# Patient Record
Sex: MALE | Race: ASIAN | NOT HISPANIC OR LATINO | URBAN - METROPOLITAN AREA
[De-identification: names, ages, dates, MRNs, and addresses within clinical notes are randomized per-mention and may not be internally consistent; named-entity substitution may affect disease eponyms.]

---

## 2019-09-01 ENCOUNTER — EMERGENCY (EMERGENCY)
Facility: HOSPITAL | Age: 34
LOS: 1 days | Discharge: HOME | End: 2019-09-01
Attending: EMERGENCY MEDICINE
Payer: MEDICAID

## 2019-09-01 VITALS
RESPIRATION RATE: 16 BRPM | OXYGEN SATURATION: 98 % | HEART RATE: 88 BPM | TEMPERATURE: 100 F | DIASTOLIC BLOOD PRESSURE: 78 MMHG | SYSTOLIC BLOOD PRESSURE: 153 MMHG

## 2019-09-01 VITALS
WEIGHT: 220.02 LBS | TEMPERATURE: 100 F | DIASTOLIC BLOOD PRESSURE: 77 MMHG | RESPIRATION RATE: 20 BRPM | OXYGEN SATURATION: 96 % | SYSTOLIC BLOOD PRESSURE: 166 MMHG | HEART RATE: 100 BPM

## 2019-09-01 DIAGNOSIS — Z98.89 OTHER SPECIFIED POSTPROCEDURAL STATES: Chronic | ICD-10-CM

## 2019-09-01 LAB
ALBUMIN SERPL ELPH-MCNC: 4.4 G/DL — SIGNIFICANT CHANGE UP (ref 3.3–5)
ALP SERPL-CCNC: 104 U/L — SIGNIFICANT CHANGE UP (ref 40–120)
ALT FLD-CCNC: 44 U/L — SIGNIFICANT CHANGE UP (ref 10–45)
ANION GAP SERPL CALC-SCNC: 13 MMOL/L — SIGNIFICANT CHANGE UP (ref 5–17)
AST SERPL-CCNC: 28 U/L — SIGNIFICANT CHANGE UP (ref 10–40)
BASOPHILS # BLD AUTO: 0 K/UL — SIGNIFICANT CHANGE UP (ref 0–0.2)
BASOPHILS NFR BLD AUTO: 0.4 % — SIGNIFICANT CHANGE UP (ref 0–2)
BILIRUB SERPL-MCNC: 0.4 MG/DL — SIGNIFICANT CHANGE UP (ref 0.2–1.2)
BUN SERPL-MCNC: 13 MG/DL — SIGNIFICANT CHANGE UP (ref 7–23)
CALCIUM SERPL-MCNC: 9.8 MG/DL — SIGNIFICANT CHANGE UP (ref 8.4–10.5)
CHLORIDE SERPL-SCNC: 98 MMOL/L — SIGNIFICANT CHANGE UP (ref 96–108)
CO2 SERPL-SCNC: 25 MMOL/L — SIGNIFICANT CHANGE UP (ref 22–31)
CREAT SERPL-MCNC: 0.86 MG/DL — SIGNIFICANT CHANGE UP (ref 0.5–1.3)
EOSINOPHIL # BLD AUTO: 0.4 K/UL — SIGNIFICANT CHANGE UP (ref 0–0.5)
EOSINOPHIL NFR BLD AUTO: 2.7 % — SIGNIFICANT CHANGE UP (ref 0–6)
GLUCOSE SERPL-MCNC: 112 MG/DL — HIGH (ref 70–99)
HCT VFR BLD CALC: 43.5 % — SIGNIFICANT CHANGE UP (ref 39–50)
HGB BLD-MCNC: 14.2 G/DL — SIGNIFICANT CHANGE UP (ref 13–17)
LYMPHOCYTES # BLD AUTO: 22 % — SIGNIFICANT CHANGE UP (ref 13–44)
LYMPHOCYTES # BLD AUTO: 3 K/UL — SIGNIFICANT CHANGE UP (ref 1–3.3)
MCHC RBC-ENTMCNC: 28.9 PG — SIGNIFICANT CHANGE UP (ref 27–34)
MCHC RBC-ENTMCNC: 32.6 GM/DL — SIGNIFICANT CHANGE UP (ref 32–36)
MCV RBC AUTO: 88.6 FL — SIGNIFICANT CHANGE UP (ref 80–100)
MONOCYTES # BLD AUTO: 1.3 K/UL — HIGH (ref 0–0.9)
MONOCYTES NFR BLD AUTO: 9.2 % — SIGNIFICANT CHANGE UP (ref 2–14)
NEUTROPHILS # BLD AUTO: 9.1 K/UL — HIGH (ref 1.8–7.4)
NEUTROPHILS NFR BLD AUTO: 65.7 % — SIGNIFICANT CHANGE UP (ref 43–77)
PLATELET # BLD AUTO: 240 K/UL — SIGNIFICANT CHANGE UP (ref 150–400)
POTASSIUM SERPL-MCNC: 3.9 MMOL/L — SIGNIFICANT CHANGE UP (ref 3.5–5.3)
POTASSIUM SERPL-SCNC: 3.9 MMOL/L — SIGNIFICANT CHANGE UP (ref 3.5–5.3)
PROT SERPL-MCNC: 7.7 G/DL — SIGNIFICANT CHANGE UP (ref 6–8.3)
RBC # BLD: 4.91 M/UL — SIGNIFICANT CHANGE UP (ref 4.2–5.8)
RBC # FLD: 12.6 % — SIGNIFICANT CHANGE UP (ref 10.3–14.5)
SODIUM SERPL-SCNC: 136 MMOL/L — SIGNIFICANT CHANGE UP (ref 135–145)
WBC # BLD: 13.9 K/UL — HIGH (ref 3.8–10.5)
WBC # FLD AUTO: 13.9 K/UL — HIGH (ref 3.8–10.5)

## 2019-09-01 PROCEDURE — 99284 EMERGENCY DEPT VISIT MOD MDM: CPT

## 2019-09-01 PROCEDURE — 80053 COMPREHEN METABOLIC PANEL: CPT

## 2019-09-01 PROCEDURE — 96375 TX/PRO/DX INJ NEW DRUG ADDON: CPT

## 2019-09-01 PROCEDURE — 71046 X-RAY EXAM CHEST 2 VIEWS: CPT

## 2019-09-01 PROCEDURE — 71046 X-RAY EXAM CHEST 2 VIEWS: CPT | Mod: 26

## 2019-09-01 PROCEDURE — 99284 EMERGENCY DEPT VISIT MOD MDM: CPT | Mod: 25

## 2019-09-01 PROCEDURE — 96374 THER/PROPH/DIAG INJ IV PUSH: CPT

## 2019-09-01 PROCEDURE — 85027 COMPLETE CBC AUTOMATED: CPT

## 2019-09-01 RX ORDER — SODIUM CHLORIDE 9 MG/ML
1000 INJECTION INTRAMUSCULAR; INTRAVENOUS; SUBCUTANEOUS ONCE
Refills: 0 | Status: COMPLETED | OUTPATIENT
Start: 2019-09-01 | End: 2019-09-01

## 2019-09-01 RX ORDER — ACETAMINOPHEN 500 MG
650 TABLET ORAL ONCE
Refills: 0 | Status: COMPLETED | OUTPATIENT
Start: 2019-09-01 | End: 2019-09-01

## 2019-09-01 RX ORDER — METOCLOPRAMIDE HCL 10 MG
10 TABLET ORAL ONCE
Refills: 0 | Status: COMPLETED | OUTPATIENT
Start: 2019-09-01 | End: 2019-09-01

## 2019-09-01 RX ORDER — KETOROLAC TROMETHAMINE 30 MG/ML
15 SYRINGE (ML) INJECTION ONCE
Refills: 0 | Status: DISCONTINUED | OUTPATIENT
Start: 2019-09-01 | End: 2019-09-01

## 2019-09-01 RX ADMIN — SODIUM CHLORIDE 1000 MILLILITER(S): 9 INJECTION INTRAMUSCULAR; INTRAVENOUS; SUBCUTANEOUS at 04:56

## 2019-09-01 RX ADMIN — Medication 650 MILLIGRAM(S): at 03:39

## 2019-09-01 RX ADMIN — SODIUM CHLORIDE 1000 MILLILITER(S): 9 INJECTION INTRAMUSCULAR; INTRAVENOUS; SUBCUTANEOUS at 03:39

## 2019-09-01 RX ADMIN — Medication 10 MILLIGRAM(S): at 03:39

## 2019-09-01 RX ADMIN — Medication 15 MILLIGRAM(S): at 04:55

## 2019-09-01 NOTE — ED PROVIDER NOTE - CLINICAL SUMMARY MEDICAL DECISION MAKING FREE TEXT BOX
34M w/ multiple complaints - consistent w/ viral URI, low concern for intracranial pathology, will check basics, cxr, give sx relief, reassess 34M w/ multiple complaints - consistent w/ viral URI, low concern for intracranial pathology, will check basics, cxr, give sx relief, reassess    MONICA Sterling MD: Pt is a 35 y/o M w/ pmh colon CA s/p resection (in remission, not on chemo) -- p/w congestion, headache, cough, fever/chills x 1 wk, worsening. No n/v/d/c, chest / abd pain, cough, dizziness, dysuria/hematuria. No recent travel, medication change, illness, or hospitalization. Has not had flu shot this yr. No neck pain/stiffness/meningismus/IVDA, back pain.  Ddx includes, however, is not limited to: viral syndrome, influenza, pna, uti, other. Plan: basic labs, u/a, ucx, cxr, IVF, pain control, reassess

## 2019-09-01 NOTE — ED PROVIDER NOTE - PHYSICAL EXAMINATION
*GEN:   mildly uncomfortable, in no acute distress, AOx3  *EYES:   pupils equally round and reactive to light, extra-occular movements intact  *HEENT:   airway patent, moist mucosal membranes  *CV:   regular rate and rhythm  *RESP:   clear to auscultation bilaterally, non-labored  *ABD:   soft, non-tender througout  *:   no cva/flank tenderness  *EXTREM:   no MSK tenderness, full ROM throughout, no leg swelling  *SKIN:   dry, intact  *NEURO:   AOx3, cranial nerves intact throughout, strength 5/5, no focal loss of sensation, no pronator drift, finger/nose normal, ambulating w/ normal gait

## 2019-09-01 NOTE — ED PROVIDER NOTE - PATIENT PORTAL LINK FT
You can access the FollowMyHealth Patient Portal offered by [facility] by registering at the following website: [email or web address]. By joining [facility]’s FollowMyHealth portal, you will also be able to view your health information using other applications (apps) compatible with our system.

## 2019-09-01 NOTE — ED PROVIDER NOTE - OBJECTIVE STATEMENT
34M w/ pmh colon CA s/p resection (in remission, not on chemo) -- p/w congestion, headache, cough, fever/chills x 1 wk, worsening. No n/v/d/c, chest / abd pain, cough, dizziness, dysuria/hematuria. No recent travel, medication change, illness, or hospitalization.

## 2019-09-01 NOTE — ED PROVIDER NOTE - NSFOLLOWUPINSTRUCTIONS_ED_ALL_ED_FT
Follow up with your primary care doctor within the next 3-5 days for re-evaluation and continued care.   Please take TYLENOL 650mg every 4 hours, and IBUPROFEN 400mg every 6 hours, as needed for pain.    Viral Respiratory Infection  A respiratory infection is an illness that affects part of the respiratory system, such as the lungs, nose, or throat. A respiratory infection that is caused by a virus is called a viral respiratory infection.    Common types of viral respiratory infections include:  A cold.  The flu (influenza).  A respiratory syncytial virus (RSV) infection.  What are the causes?  This condition is caused by a virus.    What are the signs or symptoms?  Symptoms of this condition include:  A stuffy or runny nose.  Yellow or green nasal discharge.  A cough.  Sneezing.  Fatigue.  Achy muscles.  A sore throat.  Sweating or chills.  A fever.  A headache.  How is this diagnosed?  This condition may be diagnosed based on:  Your symptoms.  A physical exam.  Testing of nasal swabs.  How is this treated?  This condition may be treated with medicines, such as:  Antiviral medicine. This may shorten the length of time a person has symptoms.  Expectorants. These make it easier to cough up mucus.  Decongestant nasal sprays.  Acetaminophen or NSAIDs to relieve fever and pain.  Antibiotic medicines are not prescribed for viral infections. This is because antibiotics are designed to kill bacteria. They are not effective against viruses.    Follow these instructions at home:  Image   Managing pain and congestion     Take over-the-counter and prescription medicines only as told by your health care provider.  If you have a sore throat, gargle with a salt-water mixture 3–4 times a day or as needed. To make a salt-water mixture, completely dissolve ½–1 tsp of salt in 1 cup of warm water.  Use nose drops made from salt water to ease congestion and soften raw skin around your nose.  Drink enough fluid to keep your urine pale yellow. This helps prevent dehydration and helps loosen up mucus.  General instructions     Rest as much as possible.  Do not drink alcohol.  Do not use any products that contain nicotine or tobacco, such as cigarettes and e-cigarettes. If you need help quitting, ask your health care provider.  Keep all follow-up visits as told by your health care provider. This is important.  How is this prevented?  Image   Get an annual flu shot. You may get the flu shot in late summer, fall, or winter. Ask your health care provider when you should get your flu shot.  Avoid exposing others to your respiratory infection.  Stay home from work or school as told by your health care provider.  Wash your hands with soap and water often, especially after you cough or sneeze. If soap and water are not available, use alcohol-based hand .  Avoid contact with people who are sick during cold and flu season. This is generally fall and winter.  Contact a health care provider if:  Your symptoms last for 10 days or longer.  Your symptoms get worse over time.  You have a fever.  You have severe sinus pain in your face or forehead.  The glands in your jaw or neck become very swollen.  Get help right away if you:  Feel pain or pressure in your chest.  Have shortness of breath.  Faint or feel like you will faint.  Have severe and persistent vomiting.  Feel confused or disoriented.  Summary  A respiratory infection is an illness that affects part of the respiratory system, such as the lungs, nose, or throat. A respiratory infection that is caused by a virus is called a viral respiratory infection.  Common types of viral respiratory infections are a cold, influenza, and respiratory syncytial virus (RSV) infection.  Symptoms of this condition include a stuffy or runny nose, cough, sneezing, fatigue, achy muscles, sore throat, and fevers or chills.  Antibiotic medicines are not prescribed for viral infections. This is because antibiotics are designed to kill bacteria. They are not effective against viruses.  This information is not intended to replace advice given to you by your health care provider. Make sure you discuss any questions you have with your health care provider.

## 2019-09-01 NOTE — ED ADULT NURSE NOTE - OBJECTIVE STATEMENT
33 y/o male with pmh colon cancer (chemo 5 years ago), left elbow surgery arrives to ED with c/o fever/cough x 1 week. Patient reports having productive cough with sputum ranging from yellow to green and grey. Patient has been taking liquid mucinex without relief. Patient respirations even/unlabored, productive cough noted. No 33 y/o male with pmh colon cancer (chemo 5 years ago), left elbow surgery arrives to ED with c/o fever/cough x 1 week. Patient reports having productive cough with sputum ranging from yellow to green and grey. Patient has been taking liquid mucinex without relief. Patient respirations even/unlabored, productive cough noted. No shortness of breath, chest pain, palpitations, abdomen pain, n/v/d. No urinary symptoms, constipation/diarrhea. No recent travel.

## 2023-04-06 NOTE — ED ADULT TRIAGE NOTE - WEIGHT IN KG
Detail Level: Zone Render Risk Assessment In Note?: no Note Text (......Xxx Chief Complaint.): This diagnosis correlates with the Other (Free Text): Kenalog 5 \\n0.5 injected middle sternum 99.8

## 2023-07-24 NOTE — ED PROVIDER NOTE - NSCAREINITIATED _GEN_ER
Domingo Dsouza(Resident)
What Is The Reason For Today's Visit?: History of Melanoma
Year Excised?: 2020